# Patient Record
Sex: MALE | Race: OTHER | Employment: OTHER | URBAN - METROPOLITAN AREA
[De-identification: names, ages, dates, MRNs, and addresses within clinical notes are randomized per-mention and may not be internally consistent; named-entity substitution may affect disease eponyms.]

---

## 2017-12-25 ENCOUNTER — HOSPITAL ENCOUNTER (EMERGENCY)
Age: 37
Discharge: HOME | End: 2017-12-25
Attending: INTERNAL MEDICINE
Payer: OTHER GOVERNMENT

## 2017-12-25 VITALS
HEART RATE: 67 BPM | WEIGHT: 196 LBS | OXYGEN SATURATION: 98 % | HEIGHT: 65 IN | TEMPERATURE: 98.6 F | SYSTOLIC BLOOD PRESSURE: 145 MMHG | RESPIRATION RATE: 16 BRPM | BODY MASS INDEX: 32.65 KG/M2 | DIASTOLIC BLOOD PRESSURE: 97 MMHG

## 2017-12-25 DIAGNOSIS — S61.211A LACERATION OF LEFT INDEX FINGER WITHOUT FOREIGN BODY WITHOUT DAMAGE TO NAIL, INITIAL ENCOUNTER: Primary | ICD-10-CM

## 2017-12-25 PROCEDURE — 77030002986 HC SUT PROL J&J -A

## 2017-12-25 PROCEDURE — 74011000250 HC RX REV CODE- 250: Performed by: PHYSICIAN ASSISTANT

## 2017-12-25 PROCEDURE — 99282 EMERGENCY DEPT VISIT SF MDM: CPT

## 2017-12-25 PROCEDURE — 75810000293 HC SIMP/SUPERF WND  RPR

## 2017-12-25 PROCEDURE — 77030002916 HC SUT ETHLN J&J -A

## 2017-12-25 RX ORDER — BISMUTH SUBSALICYLATE 262 MG
1 TABLET,CHEWABLE ORAL DAILY
COMMUNITY

## 2017-12-25 RX ADMIN — Medication 2 ML: at 09:19

## 2017-12-25 NOTE — ED PROVIDER NOTES
EMERGENCY DEPARTMENT HISTORY AND PHYSICAL EXAM    Date: 12/25/2017  Patient Name: Richa Fountain    History of Presenting Illness     Chief Complaint   Patient presents with    Laceration     History Provided By: Patient    Chief Complaint: laceration  Duration: 48 Minutes  Location: left index finger  Associated Symptoms: controlled bleeding     Additional History (Context):   9:14 AM  Richa Fountain is a 42 y.o. male who presents to the emergency department C/O laceration on left index finger onset 50 minutes ago s/p cooking breakfast. Associated symptoms include controlled bleeding. UTD on tetanus vaccination. Pt denies fever and any other Sx or complaints. PCP: Isaias Pham MD      Past History     Past Medical History:  History reviewed. No pertinent past medical history. Past Surgical History:  Past Surgical History:   Procedure Laterality Date    HX ACL RECONSTRUCTION      replaced rt knee ACL    HX KNEE ARTHROSCOPY      rt knee meniscus tear        Family History:  History reviewed. No pertinent family history. Social History:  Social History   Substance Use Topics    Smoking status: Light Tobacco Smoker    Smokeless tobacco: Never Used    Alcohol use No       Allergies:  No Known Allergies      Review of Systems   Review of Systems   Constitutional: Negative for fever. HENT: Positive for ear discharge. Skin: Positive for wound (laceration on left index finger). (+) controlled bleeding     Neurological:             All other systems reviewed and are negative. Physical Exam     Vitals:    12/25/17 0915   BP: (!) 145/97   Pulse: 67   Resp: 16   Temp: 98.6 °F (37 °C)   SpO2: 98%   Weight: 88.9 kg (196 lb)   Height: 5' 5\" (1.651 m)     Physical Exam   Constitutional: He is oriented to person, place, and time. Vital signs are normal. He appears well-developed and well-nourished. No distress. HENT:   Head: Normocephalic and atraumatic.    Eyes: Conjunctivae are normal.   Neck: Normal range of motion. Neck supple. Cardiovascular: Normal rate, regular rhythm and normal heart sounds. Pulmonary/Chest: Effort normal and breath sounds normal. No respiratory distress. Musculoskeletal: Normal range of motion. Left hand: He exhibits laceration (Approximately 1 inch laceration to left index finger pad no extension to nail bed. ). Hands:  No foreign body. Mild active bleeding on exam. Neurovascularly intact. Neurological: He is alert and oriented to person, place, and time. Skin: Skin is warm and dry. Laceration noted. He is not diaphoretic. Approximately 1 inch laceration to left index finger pad no extension to nail bed. Psychiatric: He has a normal mood and affect. His behavior is normal.   Nursing note and vitals reviewed. Diagnostic Study Results     Labs -   No results found for this or any previous visit (from the past 12 hour(s)). Radiologic Studies -   No orders to display     CT Results  (Last 48 hours)    None        CXR Results  (Last 48 hours)    None          Medications given in the ED-  Medications   lidocaine/EPINEPHrine/tetracaine (LET) topical solution 2 mL (2 mL Topical Given by Provider 12/25/17 9666)         Medical Decision Making   I am the first provider for this patient. I reviewed the vital signs, available nursing notes, past medical history, past surgical history, family history and social history. Vital Signs-Reviewed the patient's vital signs.     Pulse Oximetry Analysis - 98% on RA     Records Reviewed: Nursing Notes    Procedures:   Wound Repair  Date/Time: 12/25/2017 10:14 AM  Performed by: Nadir Arana provider: Aarti Dean MD  Preparation: skin prepped with Shur-Clens  Location details: left index finger  Wound length:2.6 - 7.5 cm  Anesthesia: local infiltration    Anesthesia:  Local Anesthetic: lidocaine 1% without epinephrine and LET (lido,epi,tetracaine)  Anesthetic total: 2 mL  Foreign bodies: no foreign bodies  Irrigation solution: saline  Irrigation method: syringe  Debridement: none  Skin closure: 6-0 nylon  Number of sutures: 5  Technique: simple and interrupted  Approximation: close  Dressing: antibiotic ointment and tube gauze  Patient tolerance: Patient tolerated the procedure well with no immediate complications  My total time at bedside, performing this procedure was 1-15 minutes. ED Course:   9:14 AM Initial assessment performed. The patients presenting problems have been discussed, and they are in agreement with the care plan formulated and outlined with them. I have encouraged them to ask questions as they arise throughout their visit. Diagnosis and Disposition       DISCHARGE NOTE:  10:36 AM  Bryce Mercado  results have been reviewed with him. He has been counseled regarding his diagnosis, treatment, and plan. He verbally conveys understanding and agreement of the signs, symptoms, diagnosis, treatment and prognosis and additionally agrees to follow up as discussed. He also agrees with the care-plan and conveys that all of his questions have been answered. I have also provided discharge instructions for him that include: educational information regarding their diagnosis and treatment, and list of reasons why they would want to return to the ED prior to their follow-up appointment, should his condition change. He has been provided with education for proper emergency department utilization. CLINICAL IMPRESSION:    1. Laceration of left index finger without foreign body without damage to nail, initial encounter        PLAN:  1. D/C Home  2. Discharge Medication List as of 12/25/2017 10:36 AM        3.    Follow-up Information     Follow up With Details Comments Contact Info    [facility] Call For follow up with [address]    [facility] EMERGENCY DEPT Go to Return to ED or [facility] for suture removal in 5-7 days  [address] [address] [phone] _______________________________    Attestations: This note is prepared by Viridiana Huynh, acting as Scribe for Ghislaine Perez PA-C. Masha Perez PA-C.:  The scribe's documentation has been prepared under my direction and personally reviewed by me in its entirety.   I confirm that the note above accurately reflects all work, treatment, procedures, and medical decision making performed by me.  _______________________________

## 2017-12-25 NOTE — ED NOTES
Discharged per ambulatory, no acute distress on discharge, written inst given to pt, verbalizes understanding  Non stick dressing applied with tube gauze  Patient armband removed and shredded

## 2017-12-25 NOTE — DISCHARGE INSTRUCTIONS
Cuts on the Hand Closed With Stitches: Care Instructions  Your Care Instructions    A cut on your hand can be on your fingers, your thumb, or the front or back of your hand. Sometimes a cut can injure the tendons, blood vessels, or nerves of your hand. The doctor used stitches to close the cut. Using stitches also helps the cut heal and reduces scarring. The doctor may have given you a splint to help prevent you from moving your hand, fingers, or thumb. If the cut went deep and through the skin, the doctor put in two layers of stitches. The deeper layer brings the deep part of the cut together. These stitches will dissolve and don't need to be removed. The stitches in the upper layer are the ones you see on the cut. You will probably have a bandage. You will need to have the stitches removed, usually in 7 to 14 days. The doctor may suggest that you see a hand specialist if the cut is very deep or if you have trouble moving your fingers or have less feeling in your hand. The doctor has checked you carefully, but problems can develop later. If you notice any problems or new symptoms, get medical treatment right away. Follow-up care is a key part of your treatment and safety. Be sure to make and go to all appointments, and call your doctor if you are having problems. It's also a good idea to know your test results and keep a list of the medicines you take. How can you care for yourself at home? · Keep the cut dry for the first 24 to 48 hours. After this, you can shower if your doctor okays it. Pat the cut dry. · Don't soak the cut, such as in a bathtub. Your doctor will tell you when it's safe to get the cut wet. · If your doctor told you how to care for your cut, follow your doctor's instructions. If you did not get instructions, follow this general advice:  ¨ After the first 24 to 48 hours, wash around the cut with clean water 2 times a day.  Don't use hydrogen peroxide or alcohol, which can slow healing. ¨ You may cover the cut with a thin layer of petroleum jelly, such as Vaseline, and a nonstick bandage. ¨ Apply more petroleum jelly and replace the bandage as needed. · Prop up the sore hand on a pillow anytime you sit or lie down during the next 3 days. Try to keep it above the level of your heart. This will help reduce swelling. · Avoid any activity that could cause your cut to reopen. · Do not remove the stitches on your own. Your doctor will tell you when to come back to have the stitches removed. · Be safe with medicines. Take pain medicines exactly as directed. ¨ If the doctor gave you a prescription medicine for pain, take it as prescribed. ¨ If you are not taking a prescription pain medicine, ask your doctor if you can take an over-the-counter medicine. When should you call for help? Call your doctor now or seek immediate medical care if:  ? · You have new pain, or your pain gets worse. ? · The skin near the cut is cold or pale or changes color. ? · You have tingling, weakness, or numbness near the cut.   ? · The cut starts to bleed, and blood soaks through the bandage. Oozing small amounts of blood is normal.   ? · You have trouble moving the area of the hand near the cut.   ? · You have symptoms of infection, such as:  ¨ Increased pain, swelling, warmth, or redness around the cut. ¨ Red streaks leading from the cut. ¨ Pus draining from the cut. ¨ A fever. ? Watch closely for changes in your health, and be sure to contact your doctor if:  ? · You do not get better as expected. Where can you learn more? Go to http://junaid-ayden.info/. Enter T250 in the search box to learn more about \"Cuts on the Hand Closed With Stitches: Care Instructions. \"  Current as of: March 20, 2017  Content Version: 11.4  © 0117-5594 [employer].  Care instructions adapted under license by [employer] (which disclaims liability or warranty for this information). If you have questions about a medical condition or this instruction, always ask your healthcare professional. [place] [address] any warranty or liability for your use of this information.

## 2017-12-25 NOTE — ED TRIAGE NOTES
Pt c/o lac to lt index finger this am while cutting meat. Approx.  1\" long, bleeding controlled on arrival. Bandaged on arrival

## 2022-11-11 ENCOUNTER — HOSPITAL ENCOUNTER (EMERGENCY)
Age: 42
Discharge: HOME | End: 2022-11-11
Attending: STUDENT IN AN ORGANIZED HEALTH CARE EDUCATION/TRAINING PROGRAM
Payer: OTHER GOVERNMENT

## 2022-11-11 VITALS
WEIGHT: 215 LBS | TEMPERATURE: 100.1 F | OXYGEN SATURATION: 98 % | HEART RATE: 87 BPM | SYSTOLIC BLOOD PRESSURE: 130 MMHG | RESPIRATION RATE: 18 BRPM | HEIGHT: 65 IN | BODY MASS INDEX: 35.82 KG/M2 | DIASTOLIC BLOOD PRESSURE: 78 MMHG

## 2022-11-11 DIAGNOSIS — J11.1 INFLUENZA-LIKE ILLNESS: Primary | ICD-10-CM

## 2022-11-11 PROCEDURE — 99282 EMERGENCY DEPT VISIT SF MDM: CPT

## 2022-11-11 NOTE — ED TRIAGE NOTES
Pt report headache, runny nose, cough and fever since yesterday. Temp at home was 108. Motrin was given at 1440.

## 2022-11-12 NOTE — ED PROVIDER NOTES
EMERGENCY DEPARTMENT HISTORY AND PHYSICAL EXAM    Date: 11/11/2022  Patient Name: Aaron Quintero    History of Presenting Illness     Chief Complaint   Patient presents with    Fever    Cough    Nasal Congestion         History Provided By: Patient    7:13 PM  Aaron Quintero is a 35 y.o. male with PMHX of hypertension who presents to the emergency department C/O fever, chills, body aches, cough, congestion and sinus pressure which began yesterday. Son being seen with similar symptoms. Patient is vaccinated against [id number], just received flu vaccine yesterday. Pt denies shortness of breath, sore throat, and any other sxs or complaints. PCP: Isaias Pham MD    Current Outpatient Medications   Medication Sig Dispense Refill    multivitamin (ONE A DAY) tablet Take 1 Tab by mouth daily. Past History     Past Medical History:  No past medical history on file. Past Surgical History:  Past Surgical History:   Procedure Laterality Date    HX ACL RECONSTRUCTION      replaced rt knee ACL    HX KNEE ARTHROSCOPY      rt knee meniscus tear        Family History:  No family history on file. Social History:  Social History     Tobacco Use    Smoking status: Light Smoker    Smokeless tobacco: Never   Substance Use Topics    Alcohol use: No    Drug use: No       Allergies:  No Known Allergies      Review of Systems   Review of Systems   Constitutional:  Positive for fever. HENT:  Positive for congestion. Negative for sore throat. Respiratory:  Positive for cough. Musculoskeletal:  Positive for myalgias. All other systems reviewed and are negative. Physical Exam     Vitals:    11/11/22 1726 11/11/22 1830   BP: 130/78    Pulse: 87    Resp: 18    Temp: 100.1 °F (37.8 °C)    SpO2: 98% 98%   Weight: 97.5 kg (215 lb)    Height: 5' 5\" (1.651 m)      Physical Exam  Vital signs and nursing notes reviewed. CONSTITUTIONAL: Alert. Well-appearing; well-nourished; in no apparent distress.   HEAD: Normocephalic; atraumatic. EYES: Conjunctiva clear. ENT: TM's normal. External ear normal. Normal nose; no rhinorrhea. Normal pharynx. Tonsils not enlarged without exudate. Moist mucus membranes. NECK: Supple; FROM without difficulty, non-tender; no cervical lymphadenopathy. CV: Normal S1, S2; no murmurs, rubs, or gallops. No chest wall tenderness. RESPIRATORY: Normal chest excursion with respiration; breath sounds clear and equal bilaterally; no wheezes, rhonchi, or rales. SKIN: Normal for age and race; warm; dry; good turgor; no apparent lesions or exudate. NEURO: A & O x3. PSYCH:  Mood and affect appropriate. Diagnostic Study Results     Labs -   No results found for this or any previous visit (from the past 12 hour(s)). Radiologic Studies -   No orders to display     CT Results  (Last 48 hours)      None          CXR Results  (Last 48 hours)      None            Medications given in the ED-  Medications - No data to display      Medical Decision Making   I am the first provider for this patient. I reviewed the vital signs, available nursing notes, past medical history, past surgical history, family history and social history. Vital Signs-Reviewed the patient's vital signs. Records Reviewed: Nursing Notes      Procedures:  Procedures    ED Course:  7:13 PM   Initial assessment performed. The patients presenting problems have been discussed, and they are in agreement with the care plan formulated and outlined with them. I have encouraged them to ask questions as they arise throughout their visit. Provider Notes (Medical Decision Making): Margarita Chan is a 35 y.o. male presents with flulike symptoms since yesterday. His son being seen with similar symptoms. He is well-appearing with normal vitals, low-grade fever, taking medication just prior to arrival.  Consistent with flulike process, COVID versus flu versus other virus.   He declined need for nasal swab for testing, over-the-counter medications for symptomatic relief and return precautions such as shortness of breath or fever beyond 1 week discussed. Otherwise should run its course. Diagnosis and Disposition       DISCHARGE NOTE:    Jomar Benz  results have been reviewed with him. He has been counseled regarding his diagnosis, treatment, and plan. He verbally conveys understanding and agreement of the signs, symptoms, diagnosis, treatment and prognosis and additionally agrees to follow up as discussed. He also agrees with the care-plan and conveys that all of his questions have been answered. I have also provided discharge instructions for him that include: educational information regarding their diagnosis and treatment, and list of reasons why they would want to return to the ED prior to their follow-up appointment, should his condition change. He has been provided with education for proper emergency department utilization. CLINICAL IMPRESSION:    1. Influenza-like illness        PLAN:  1. D/C Home  2. Discharge Medication List as of 11/11/2022  6:53 PM        3. Follow-up Information       Follow up With Specialties Details Why Contact Info    Your PCP   As needed     [facility] EMERGENCY DE[facility] Emergency Medicine  As needed, If symptoms worsen [address] [address]  [phone]          _______________________________      Please note that this dictation was completed with [email or web address], the computer voice recognition software. Quite often unanticipated grammatical, syntax, homophones, and other interpretive errors are inadvertently transcribed by the computer software. Please disregard these errors. Please excuse any errors that have escaped final proofreading.